# Patient Record
Sex: FEMALE | Race: WHITE | URBAN - METROPOLITAN AREA
[De-identification: names, ages, dates, MRNs, and addresses within clinical notes are randomized per-mention and may not be internally consistent; named-entity substitution may affect disease eponyms.]

---

## 2024-11-30 ENCOUNTER — OFFICE VISIT (OUTPATIENT)
Dept: URGENT CARE | Age: 24
End: 2024-11-30
Payer: COMMERCIAL

## 2024-11-30 VITALS
SYSTOLIC BLOOD PRESSURE: 97 MMHG | RESPIRATION RATE: 16 BRPM | DIASTOLIC BLOOD PRESSURE: 53 MMHG | OXYGEN SATURATION: 99 % | TEMPERATURE: 98.5 F | HEART RATE: 81 BPM

## 2024-11-30 DIAGNOSIS — J02.9 SORE THROAT: ICD-10-CM

## 2024-11-30 LAB
POC RAPID STREP: NEGATIVE
POC SARS-COV-2 AG BINAX: NORMAL

## 2024-11-30 RX ORDER — GABAPENTIN 100 MG/1
100 CAPSULE ORAL 3 TIMES DAILY
COMMUNITY

## 2024-11-30 RX ORDER — SERTRALINE HYDROCHLORIDE 20 MG/ML
25 SOLUTION ORAL DAILY
COMMUNITY

## 2024-11-30 RX ORDER — TRAZODONE HYDROCHLORIDE 50 MG/1
50 TABLET ORAL NIGHTLY
COMMUNITY

## 2024-11-30 RX ORDER — TIOTROPIUM BROMIDE 18 UG/1
1 CAPSULE ORAL; RESPIRATORY (INHALATION)
COMMUNITY

## 2024-11-30 NOTE — PATIENT INSTRUCTIONS
"  Sore Throat    You are being teated for acute pharyngitis. The most likely cause is due to a virus.    PLAN:  1.  Please take Tylenol every 6 hours as needed for pain and fever. Alternate with ibuprofen every 6 hours as needed for pain and fever.  2.   You can use Chloraseptic spray to help with sore throat.  You can try MUCINEX InstaSoothe Sore Throat + Soothing Comfort - Honey & Echinacea.  Be sure to follow age appropriate instructions.    Use and take all medication as directed    3.  Gargle with salt water 4-5 times a day to help with sore throat.  4.  Stay well-hydrated and drink lots of fluids.  5.  Suck on lozenges to help moisten throat.  6.  Use saline nasal spray twice a day to keep mucous membranes moist.  7.  Follow up with your primary care physician in 3-5 days if symptoms persist.  8.  Return if symptoms worsen or new symptoms develop.    -You can treat with Chloraseptic throat spray for patients 3 years and older (use as directed) -Be sure to follow all labeling and instructions when taking over-the-counter cold medications.  -Saltwater gargles every 2 hours to pull inflammation  -Take Motrin, Tylenol for pain (use as directed)    A few helpful tips to reduce pain from a sore throat:    Diet of smooth, slippery and wet foods  Ice cream  Jell-O  Hard sucking candies (e.g. butterscotch)  Honey (2 tsp) to coat throat  Avoid in age under 1 year (Botulism risk)  Soothing throat lozenges  Menthol lozenges (e.g. Vicks)  Pectin (e.g. Halls Breezers)  Herbal tea containing \"Demulcents\" (e.g. Throat coat)  Contains Elm bark, licorice root and marshmallow root  Offers short-term relief of Pharyngitis (<30 minutes)    You most likely have a viral pharyngitis.  Antibiotics are not needed at this time.  People with strep throat or other bacterial infections are given antibiotics. The cause of your sore throat is most likely viral. It is important to relieve the pain of sore throat so that people can eat and " drink. Ibuprofen or acetaminophen helps relieve pain and fever. Warm saltwater gargles and throat lozenges or throat sprays (such as those containing benzocaine, lidocaine, or dyclonine) may temporarily help relieve pain. Providing soup is a good way to keep children well hydrated and nourished when swallowing is painful and before their appetite has returned.

## 2024-11-30 NOTE — PROGRESS NOTES
Subjective   Patient ID: Ana Richards is a 24 y.o. female. They present today with a chief complaint of Sore Throat and Headache (Sore throat, headache x 2 days).    CC: Sore throat, runny nose x 2 days    HPI: Patient presenting for sore throat.  No trismus or drooling.  No difficulty swallowing.  No fever, chills, myalgias, abdominal pain, nausea, vomiting, diarrhea, rash.     Past Medical History  Allergies as of 11/30/2024 - Reviewed 11/30/2024   Allergen Reaction Noted    Bactrim [sulfamethoxazole-trimethoprim] Unknown 11/30/2024       (Not in a hospital admission)      History reviewed. No pertinent past medical history.    History reviewed. No pertinent surgical history.         Review of Systems  Review of Systems    After reviewing all body systems I have documented pertinent findings above in the history.  All other Systems reviewed and are negative for complaint.  Pertinent positive and negatives are listed in the above HPI.    Objective    Vitals:    11/30/24 1006   BP: 97/53   Pulse: 81   Resp: 16   Temp: 36.9 °C (98.5 °F)   SpO2: 99%     No LMP recorded.  Physical Exam    General: Alert, oriented, and cooperative.  No acute distress. Well developed, well nourished.     Skin: Skin is warm, and dry. No rashes or lesions.    Eyes: Sclera and conjunctivae normal     Ears: TM´s are intact, grey, with good cone of light.  No hemotympanum or rupture. Bilateral auricle, helix, and tragus without inflammation or erythema.  No mastoid erythema, or tenderness.  No deformities. Right and left canal negative for erythema, or discharge.  Hearing is grossly intact bilaterally    Mouth/Throat: Pharynx is erythematous.  Tonsils are 1+, equal in size.  No exudates.  No angioedema of the lips or tongue.  No respiratory compromise, tripoding, drooling, muffled voice,  stridor, or trismus.     Neck: Supple.  No lymphadenopathy appreciated    Cardiac: Regular rate and rhythm    Respiratory:  No acute respiratory  distress.  Regular rate of breathing.  No accessory muscle use.  No tripoding.  Lungs are clear bilaterally.    Procedures  Point of Care Test & Imaging Results from this visit  Results for orders placed or performed in visit on 11/30/24   POCT rapid strep A manually resulted    Collection Time: 11/30/24 10:11 AM   Result Value Ref Range    POC Rapid Strep Negative Negative   POCT Covid-19 Rapid Antigen    Collection Time: 11/30/24 10:11 AM   Result Value Ref Range    POC LEVI-COV-2 AG  Presumptive negative test for SARS-CoV-2 (no antigen detected)     Presumptive negative test for SARS-CoV-2 (no antigen detected)     No results found.  Diagnostic study results (if any) were reviewed by Samy Gaytan PA-C.  Assessment/Plan   Allergies, medications, history, and pertinent labs/EKGs/Imaging reviewed by Samy Gaytan PA-C.     MDM:  Patient presenting for a sore throat x 2 days.    Exam findings positive for posterior pharyngeal erythema. Neck is supple without meningeal signs.  Patient does not appear toxic. No respiratory compromise, tripoding, drooling, muffled voice, facial or neck tenderness, erythema, warmth, edema, or crepitus. No trachea tenderness or pain out of proportion. No clinical signs to suggest Meningitis, Saul´s angina, peritonsillar abscess, epiglottis, or other life threatening oral pathology.     Counseled patient/family of the diagnosis and advised symptomatic relief with over the counter medications such as Tylenol, ibuprofen, and salt water gargle. Pt/family instructed to f/u with PCP and encouraged to return if symptoms worsen or if new symptoms develop.         Orders and Diagnoses  Diagnoses and all orders for this visit:  Sore throat  -     POCT rapid strep A manually resulted  -     POCT Covid-19 Rapid Antigen    Medical Admin Record      Patient disposition: Home    Electronically signed by Samy Gaytan PA-C  10:19 AM

## 2024-12-02 ENCOUNTER — OFFICE VISIT (OUTPATIENT)
Dept: URGENT CARE | Age: 24
End: 2024-12-02
Payer: COMMERCIAL

## 2024-12-02 VITALS
OXYGEN SATURATION: 99 % | WEIGHT: 115 LBS | HEIGHT: 63 IN | HEART RATE: 80 BPM | TEMPERATURE: 97.8 F | SYSTOLIC BLOOD PRESSURE: 113 MMHG | DIASTOLIC BLOOD PRESSURE: 80 MMHG | BODY MASS INDEX: 20.38 KG/M2 | RESPIRATION RATE: 16 BRPM

## 2024-12-02 DIAGNOSIS — J06.9 UPPER RESPIRATORY TRACT INFECTION, UNSPECIFIED TYPE: Primary | ICD-10-CM

## 2024-12-02 RX ORDER — BUSPIRONE HYDROCHLORIDE 5 MG/1
TABLET ORAL 2 TIMES DAILY
COMMUNITY

## 2024-12-02 RX ORDER — SPIRONOLACTONE 25 MG/1
TABLET ORAL DAILY
COMMUNITY

## 2024-12-02 RX ORDER — BROMPHENIRAMINE MALEATE, PSEUDOEPHEDRINE HYDROCHLORIDE, AND DEXTROMETHORPHAN HYDROBROMIDE 2; 30; 10 MG/5ML; MG/5ML; MG/5ML
5 SYRUP ORAL EVERY 6 HOURS
Qty: 100 ML | Refills: 0 | Status: SHIPPED | OUTPATIENT
Start: 2024-12-02 | End: 2024-12-07

## 2024-12-02 ASSESSMENT — ENCOUNTER SYMPTOMS
VOICE CHANGE: 1
SORE THROAT: 0
COUGH: 1
TROUBLE SWALLOWING: 0
FEVER: 0

## 2024-12-02 NOTE — PROGRESS NOTES
"Subjective   Patient ID: Ana Richards is a 24 y.o. female. They present today with a chief complaint of Sinusitis (X 4 days. Pressure, congestion. Seen here 11/30/24. Negative covid, and strep. No meds given. Blocked ears, no pain).    History of Present Illness  Patient is a 24 year old female presenting for congestion and cough.  Symptoms started 4 days ago with sore throat. She was seen here and had negative strep and covid test. Reports that night developed a fever. Since then no fever or sore throat primarily just congestion and cough. Difficulty sleeping due to symptoms. Tried mucinex and sudafed with minimal relief. Difficult time sleeping due to symptoms.       History provided by:  Patient   used: No        Past Medical History  Allergies as of 12/02/2024 - Reviewed 12/02/2024   Allergen Reaction Noted    Bactrim [sulfamethoxazole-trimethoprim] Unknown 11/30/2024       (Not in a hospital admission)       No past medical history on file.    No past surgical history on file.     reports that she has never smoked. She uses smokeless tobacco.    Review of Systems  Review of Systems   Constitutional:  Negative for fever (resolved).   HENT:  Positive for congestion and voice change. Negative for ear pain, sore throat (resolved) and trouble swallowing.    Respiratory:  Positive for cough.                                   Objective    Vitals:    12/02/24 1155   BP: 89/52   BP Location: Right arm   Patient Position: Sitting   BP Cuff Size: Large adult   Pulse: 80   Resp: 16   Temp: 36.6 °C (97.8 °F)   TempSrc: Oral   SpO2: 99%   Weight: 52.2 kg (115 lb)   Height: 1.6 m (5' 3\")     Patient's last menstrual period was 11/04/2024.    Physical Exam  Constitutional:       General: She is not in acute distress.     Appearance: Normal appearance. She is normal weight. She is ill-appearing. She is not toxic-appearing.   HENT:      Right Ear: Tympanic membrane, ear canal and external ear normal. There " is no impacted cerumen.      Left Ear: Tympanic membrane, ear canal and external ear normal. There is no impacted cerumen.      Nose: Congestion present. No rhinorrhea.      Mouth/Throat:      Mouth: Mucous membranes are moist.      Pharynx: No oropharyngeal exudate or posterior oropharyngeal erythema.   Eyes:      General: No scleral icterus.        Right eye: No discharge.         Left eye: No discharge.      Conjunctiva/sclera: Conjunctivae normal.   Neck:      Comments: Hoarse but not muffled voice   Cardiovascular:      Rate and Rhythm: Normal rate and regular rhythm.      Heart sounds: Normal heart sounds. No murmur heard.     No friction rub. No gallop.   Pulmonary:      Effort: Pulmonary effort is normal. No respiratory distress.      Breath sounds: Normal breath sounds. No stridor. No wheezing, rhonchi or rales.   Neurological:      Mental Status: She is alert.   Psychiatric:         Mood and Affect: Mood normal.         Procedures    Point of Care Test & Imaging Results from this visit  No results found for this visit on 12/02/24.   No results found.    Diagnostic study results (if any) were reviewed by Mayelin Brandt PA-C.    Assessment/Plan   Allergies, medications, history, and pertinent labs/EKGs/Imaging reviewed by Mayelin Brandt PA-C.     Medical Decision Making  Patient presenting for URI symptoms x 4 days. Seen recently here and negative strep and covid. Blood pressure initially a little soft but repeat significantly improved. Patient ill appearing but nontoxic. No findings of otitis media or strep on exam/no fever/exudate. No asymmetrical swelling of tonsils suggestive of abscess. Lungs clear and no fever to suggest pneumonia.  Nontoxic, no nuchal rigidity, low suspicion for meningitis. Discussed viral course/etiology with patient and encouraged continued supportive care.     Orders and Diagnoses  Diagnoses and all orders for this visit:  Upper respiratory tract infection, unspecified  type  -     brompheniramine-pseudoeph-DM 2-30-10 mg/5 mL syrup; Take 5 mL by mouth every 6 hours for 5 days.      Medical Admin Record    At time of discharge, patient was clinically well-appearing and appropriate for outpatient management. The patient/parent/guardian was educated regarding diagnosis, supportive care, OTC and Rx medications. The patient/parent/guardian was given the opportunity to ask questions prior to discharge. They verbalized understanding of discussion of treatment plan, expected course of illness and/or injury, indications on when to return to , when to seek further evaluation in ED/call 911, and the need to follow up with PCP and/or specialist as referred. Patient/parent/guardian was provided with work/school documentation if requested. Patient stable upon discharge.     Patient disposition: Home    Electronically signed by Mayelin Brandt PA-C  12:50 PM

## 2025-06-13 ENCOUNTER — OFFICE VISIT (OUTPATIENT)
Dept: URGENT CARE | Age: 25
End: 2025-06-13
Payer: COMMERCIAL

## 2025-06-13 VITALS
WEIGHT: 115 LBS | DIASTOLIC BLOOD PRESSURE: 80 MMHG | RESPIRATION RATE: 18 BRPM | HEART RATE: 72 BPM | SYSTOLIC BLOOD PRESSURE: 117 MMHG | TEMPERATURE: 97.5 F | OXYGEN SATURATION: 100 % | BODY MASS INDEX: 20.37 KG/M2

## 2025-06-13 DIAGNOSIS — B34.8 RHINOVIRUS INFECTION: Primary | ICD-10-CM

## 2025-06-13 DIAGNOSIS — J06.9 UPPER RESPIRATORY TRACT INFECTION, UNSPECIFIED TYPE: ICD-10-CM

## 2025-06-13 LAB
POC HUMAN RHINOVIRUS PCR: POSITIVE
POC INFLUENZA A VIRUS PCR: NEGATIVE
POC INFLUENZA B VIRUS PCR: NEGATIVE
POC RESPIRATORY SYNCYTIAL VIRUS PCR: NEGATIVE
POC STREPTOCOCCUS PYOGENES (GROUP A STREP) PCR: NEGATIVE

## 2025-06-13 NOTE — PROGRESS NOTES
Subjective   Patient ID: Ana Richards is a 25 y.o. female. She presents today with a chief complaint of Sore Throat (2 days ).    History of Present Illness  Subjective  Ana Richards is a 25 y.o. female who presents for evaluation of symptoms of a URI. Symptoms include headache, nasal congestion, and sore throat. Onset of symptoms was 1 day ago and has been unchanged since that time. Treatment to date: none.      Assessment/Plan  viral upper respiratory illness.    Discussed diagnosis and treatment of URI.  Suggested symptomatic OTC remedies.  Nasal saline spray for congestion.  Follow up as needed.            Past Medical History  Allergies as of 06/13/2025 - Reviewed 06/13/2025   Allergen Reaction Noted    Bactrim [sulfamethoxazole-trimethoprim] Unknown 11/30/2024       Prescriptions Prior to Admission[1]     Medical History[2]    Surgical History[3]     reports that she has never smoked. She uses smokeless tobacco.    Review of Systems  Review of Systems                               Objective    Vitals:    06/13/25 1736   BP: 117/80   Pulse: 72   Resp: 18   Temp: 36.4 °C (97.5 °F)   SpO2: 100%   Weight: 52.2 kg (115 lb)     No LMP recorded.    Physical Exam  Vitals and nursing note reviewed.   Constitutional:       General: She is not in acute distress.     Appearance: Normal appearance. She is not toxic-appearing.   HENT:      Right Ear: Tympanic membrane, ear canal and external ear normal.      Left Ear: Tympanic membrane, ear canal and external ear normal.      Nose: Congestion and rhinorrhea present.      Mouth/Throat:      Mouth: Mucous membranes are moist.      Pharynx: Postnasal drip present.   Eyes:      Extraocular Movements: Extraocular movements intact.      Conjunctiva/sclera: Conjunctivae normal.      Pupils: Pupils are equal, round, and reactive to light.   Cardiovascular:      Rate and Rhythm: Normal rate and regular rhythm.      Pulses: Normal pulses.      Heart sounds: Normal heart sounds.    Pulmonary:      Effort: Pulmonary effort is normal.      Breath sounds: Normal breath sounds.   Musculoskeletal:      Cervical back: Normal range of motion and neck supple. No rigidity or tenderness.   Lymphadenopathy:      Cervical: No cervical adenopathy.   Neurological:      Mental Status: She is alert.         Procedures    Point of Care Test & Imaging Results from this visit  Results for orders placed or performed in visit on 06/13/25   POCT SPOTFIRE R/ST Panel Mini w/Strep A ([facility]) manually resulted   Result Value Ref Range    POC Group A Strep, PCR Negative Negative    POC Respiratory Syncytial Virus PCR Negative Negative    POC Influenza A Virus PCR Negative Negative    POC Influenza B Virus PCR Negative Negative    POC Human Rhinovirus PCR Positive (A) Negative      Imaging  No results found.    Cardiology, Vascular, and Other Imaging  No other imaging results found for the past 2 days      Diagnostic study results (if any) were reviewed by Lilian Leahy MD.    Assessment/Plan   Allergies, medications, history, and pertinent labs/EKGs/Imaging reviewed by Lilian Leahy MD.     Medical Decision Making      Orders and Diagnoses  Diagnoses and all orders for this visit:  Upper respiratory tract infection, unspecified type  -     POCT SPOTFIRE R/ST Panel Mini w/Strep A ([facility]) manually resulted      Medical Admin Record      Patient disposition: Home    Electronically signed by Lilian Leahy MD  6:03 PM           [1] (Not in a hospital admission)  [2] No past medical history on file.  [3] No past surgical history on file.